# Patient Record
Sex: MALE | URBAN - METROPOLITAN AREA
[De-identification: names, ages, dates, MRNs, and addresses within clinical notes are randomized per-mention and may not be internally consistent; named-entity substitution may affect disease eponyms.]

---

## 2021-12-15 ENCOUNTER — APPOINTMENT (OUTPATIENT)
Age: 71
Setting detail: DERMATOLOGY
End: 2021-12-16

## 2021-12-15 DIAGNOSIS — L40.0 PSORIASIS VULGARIS: ICD-10-CM

## 2021-12-15 DIAGNOSIS — L82.0 INFLAMED SEBORRHEIC KERATOSIS: ICD-10-CM

## 2021-12-15 PROBLEM — D48.5 NEOPLASM OF UNCERTAIN BEHAVIOR OF SKIN: Status: ACTIVE | Noted: 2021-12-15

## 2021-12-15 PROCEDURE — OTHER COUNSELING: OTHER

## 2021-12-15 PROCEDURE — 11102 TANGNTL BX SKIN SINGLE LES: CPT | Mod: 59

## 2021-12-15 PROCEDURE — OTHER PRESCRIPTION: OTHER

## 2021-12-15 PROCEDURE — OTHER MIPS QUALITY: OTHER

## 2021-12-15 PROCEDURE — 17110 DESTRUCT B9 LESION 1-14: CPT

## 2021-12-15 PROCEDURE — 99203 OFFICE O/P NEW LOW 30 MIN: CPT | Mod: 25

## 2021-12-15 PROCEDURE — OTHER LIQUID NITROGEN: OTHER

## 2021-12-15 PROCEDURE — OTHER BIOPSY BY SHAVE METHOD: OTHER

## 2021-12-15 RX ORDER — CALCITRIOL 3 UG/G
OINTMENT TOPICAL BID
Qty: 100 | Refills: 3 | COMMUNITY
Start: 2021-12-15

## 2021-12-15 ASSESSMENT — LOCATION DETAILED DESCRIPTION DERM
LOCATION DETAILED: RIGHT ANTERIOR PROXIMAL THIGH
LOCATION DETAILED: RIGHT PROXIMAL LATERAL CALF
LOCATION DETAILED: RIGHT ELBOW
LOCATION DETAILED: LEFT ELBOW
LOCATION DETAILED: RIGHT PROXIMAL PRETIBIAL REGION

## 2021-12-15 ASSESSMENT — LOCATION SIMPLE DESCRIPTION DERM
LOCATION SIMPLE: LEFT ELBOW
LOCATION SIMPLE: RIGHT ELBOW
LOCATION SIMPLE: RIGHT CALF
LOCATION SIMPLE: RIGHT THIGH
LOCATION SIMPLE: RIGHT PRETIBIAL REGION

## 2021-12-15 ASSESSMENT — LOCATION ZONE DERM
LOCATION ZONE: LEG
LOCATION ZONE: ARM

## 2021-12-15 NOTE — PROCEDURE: BIOPSY BY SHAVE METHOD
X Size Of Lesion In Cm: 0
Notification Instructions: Patient will be notified of biopsy results. However, patient instructed to call the office if not contacted within 2 weeks.
Validate Triangulation: No
Biopsy Type: H and E
Consent: Written consent was obtained and risks were reviewed including but not limited to scarring, infection, bleeding, scabbing, incomplete removal, nerve damage and allergy to anesthesia.
Was A Bandage Applied: Yes
Electrodesiccation And Curettage Text: The wound bed was treated with electrodesiccation and curettage after the biopsy was performed.
Type Of Destruction Used: Curettage
Biopsy Method: Dermablade
Anesthesia Type: 1% lidocaine with epinephrine
Detail Level: Detailed
Electrodesiccation Text: The wound bed was treated with electrodesiccation after the biopsy was performed.
Dressing: bandage
Anesthesia Volume In Cc (Will Not Render If 0): 0.5
Depth Of Biopsy: dermis
Post-Care Instructions: I reviewed with the patient in detail post-care instructions. Patient is to keep the biopsy site dry overnight, and then apply bacitracin twice daily until healed. Patient may apply hydrogen peroxide soaks to remove any crusting.
Hemostasis: Drysol
Billing Type: Third-Party Bill
Cryotherapy Text: The wound bed was treated with cryotherapy after the biopsy was performed.
Silver Nitrate Text: The wound bed was treated with silver nitrate after the biopsy was performed.
Wound Care: Petrolatum
Curettage Text: The wound bed was treated with curettage after the biopsy was performed.
Information: Selecting Yes will display possible errors in your note based on the variables you have selected. This validation is only offered as a suggestion for you. PLEASE NOTE THAT THE VALIDATION TEXT WILL BE REMOVED WHEN YOU FINALIZE YOUR NOTE. IF YOU WANT TO FAX A PRELIMINARY NOTE YOU WILL NEED TO TOGGLE THIS TO 'NO' IF YOU DO NOT WANT IT IN YOUR FAXED NOTE.

## 2021-12-15 NOTE — PROCEDURE: LIQUID NITROGEN
Consent: The patient's consent was obtained including but not limited to risks of crusting, scabbing, blistering, scarring, darker or lighter pigmentary change, recurrence, incomplete removal and infection.
Add 52 Modifier (Optional): no
Show Aperture Variable?: Yes
Medical Necessity Information: It is in your best interest to select a reason for this procedure from the list below. All of these items fulfill various CMS LCD requirements except the new and changing color options.
Post-Care Instructions: I reviewed with the patient in detail post-care instructions. Patient is to wear sunprotection, and avoid picking at any of the treated lesions. Pt may apply Vaseline to crusted or scabbing areas.
Detail Level: Detailed
Medical Necessity Clause: This procedure was medically necessary because the lesions that were treated were:

## 2021-12-15 NOTE — PROCEDURE: MIPS QUALITY
Quality 110: Preventive Care And Screening: Influenza Immunization: Influenza Immunization Ordered or Recommended, but not Administered due to system reason
Quality 111:Pneumonia Vaccination Status For Older Adults: Pneumococcal Vaccination not Administered or Previously Received, Reason not Otherwise Specified
Quality 134: Screening For Clinical Depression And Follow-Up Plan: The patient was screened for depression and the screen was positive and follow up plan documented
Quality 130: Documentation Of Current Medications In The Medical Record: Current Medications Documented
Quality 431: Preventive Care And Screening: Unhealthy Alcohol Use - Screening: Patient identified as an unhealthy alcohol user when screened for unhealthy alcohol use using a systematic screening method and received brief counseling
Quality 226: Preventive Care And Screening: Tobacco Use: Screening And Cessation Intervention: Tobacco Screening not Performed for Medical Reasons
Detail Level: Detailed
Quality 402: Tobacco Use And Help With Quitting Among Adolescents: Patient screened for tobacco and never smoked

## 2022-01-24 ENCOUNTER — APPOINTMENT (OUTPATIENT)
Age: 72
Setting detail: DERMATOLOGY
End: 2022-01-25

## 2022-01-24 DIAGNOSIS — L40.0 PSORIASIS VULGARIS: ICD-10-CM

## 2022-01-24 DIAGNOSIS — L82.0 INFLAMED SEBORRHEIC KERATOSIS: ICD-10-CM

## 2022-01-24 PROCEDURE — OTHER LIQUID NITROGEN: OTHER

## 2022-01-24 PROCEDURE — 99213 OFFICE O/P EST LOW 20 MIN: CPT | Mod: 25

## 2022-01-24 PROCEDURE — 17110 DESTRUCT B9 LESION 1-14: CPT

## 2022-01-24 PROCEDURE — OTHER COUNSELING: OTHER

## 2022-01-24 PROCEDURE — OTHER MIPS QUALITY: OTHER

## 2022-01-24 PROCEDURE — OTHER PRESCRIPTION: OTHER

## 2022-01-24 RX ORDER — CALCIPOTRIENE 0.05 MG/G
CREAM TOPICAL BID
Qty: 120 | Refills: 3 | Status: ERX | COMMUNITY
Start: 2022-01-24

## 2022-01-24 RX ORDER — BETAMETHASONE DIPROPIONATE 0.5 MG/G
1 CREAM TOPICAL BID
Qty: 45 | Refills: 3 | Status: ERX | COMMUNITY
Start: 2022-01-24

## 2022-01-24 ASSESSMENT — LOCATION ZONE DERM
LOCATION ZONE: TRUNK
LOCATION ZONE: ARM
LOCATION ZONE: LEG

## 2022-01-24 ASSESSMENT — LOCATION DETAILED DESCRIPTION DERM
LOCATION DETAILED: RIGHT ELBOW
LOCATION DETAILED: PERIUMBILICAL SKIN
LOCATION DETAILED: LEFT INFERIOR LATERAL MIDBACK
LOCATION DETAILED: RIGHT ANTERIOR PROXIMAL THIGH
LOCATION DETAILED: LEFT ELBOW

## 2022-01-24 ASSESSMENT — LOCATION SIMPLE DESCRIPTION DERM
LOCATION SIMPLE: LEFT ELBOW
LOCATION SIMPLE: LEFT LOWER BACK
LOCATION SIMPLE: ABDOMEN
LOCATION SIMPLE: RIGHT ELBOW
LOCATION SIMPLE: RIGHT THIGH

## 2022-01-24 NOTE — PROCEDURE: LIQUID NITROGEN
Show Aperture Variable?: Yes
Detail Level: Detailed
Medical Necessity Clause: This procedure was medically necessary because the lesions that were treated were:
Add 52 Modifier (Optional): no
Spray Paint Text: The liquid nitrogen was applied to the skin utilizing a spray paint frosting technique.
Post-Care Instructions: I reviewed with the patient in detail post-care instructions. Patient is to wear sunprotection, and avoid picking at any of the treated lesions. Pt may apply Vaseline to crusted or scabbing areas.
Consent: The patient's consent was obtained including but not limited to risks of crusting, scabbing, blistering, scarring, darker or lighter pigmentary change, recurrence, incomplete removal and infection.
Medical Necessity Information: It is in your best interest to select a reason for this procedure from the list below. All of these items fulfill various CMS LCD requirements except the new and changing color options.

## 2022-01-24 NOTE — PROCEDURE: MIPS QUALITY
Quality 111:Pneumonia Vaccination Status For Older Adults: Pneumococcal Vaccination not Administered or Previously Received, Reason not Otherwise Specified
Detail Level: Detailed
Quality 130: Documentation Of Current Medications In The Medical Record: Current Medications Documented
Quality 431: Preventive Care And Screening: Unhealthy Alcohol Use - Screening: Patient identified as an unhealthy alcohol user when screened for unhealthy alcohol use using a systematic screening method and received brief counseling
Quality 402: Tobacco Use And Help With Quitting Among Adolescents: Patient screened for tobacco and never smoked
Quality 110: Preventive Care And Screening: Influenza Immunization: Influenza Immunization Ordered or Recommended, but not Administered due to system reason
Quality 134: Screening For Clinical Depression And Follow-Up Plan: The patient was screened for depression and the screen was positive and follow up plan documented
Quality 226: Preventive Care And Screening: Tobacco Use: Screening And Cessation Intervention: Tobacco Screening not Performed for Medical Reasons

## 2022-04-13 ENCOUNTER — APPOINTMENT (OUTPATIENT)
Age: 72
Setting detail: DERMATOLOGY
End: 2022-04-15

## 2022-04-13 DIAGNOSIS — L40.0 PSORIASIS VULGARIS: ICD-10-CM

## 2022-04-13 PROCEDURE — OTHER COUNSELING: OTHER

## 2022-04-13 PROCEDURE — 99212 OFFICE O/P EST SF 10 MIN: CPT

## 2022-04-13 PROCEDURE — OTHER ADDITIONAL NOTES: OTHER

## 2022-04-13 PROCEDURE — OTHER MIPS QUALITY: OTHER

## 2022-04-13 ASSESSMENT — LOCATION SIMPLE DESCRIPTION DERM
LOCATION SIMPLE: RIGHT ELBOW
LOCATION SIMPLE: LEFT ELBOW

## 2022-04-13 ASSESSMENT — LOCATION DETAILED DESCRIPTION DERM
LOCATION DETAILED: LEFT ELBOW
LOCATION DETAILED: RIGHT ELBOW

## 2022-04-13 ASSESSMENT — LOCATION ZONE DERM: LOCATION ZONE: ARM

## 2022-04-13 NOTE — PROCEDURE: ADDITIONAL NOTES
Additional Notes: Pt seen some improvement use the enstilar sample we provided
Render Risk Assessment In Note?: no
Detail Level: Simple

## 2022-04-13 NOTE — PROCEDURE: MIPS QUALITY
Quality 110: Preventive Care And Screening: Influenza Immunization: Influenza Immunization Ordered or Recommended, but not Administered due to system reason
Quality 226: Preventive Care And Screening: Tobacco Use: Screening And Cessation Intervention: Tobacco Screening not Performed for Medical Reasons
Quality 431: Preventive Care And Screening: Unhealthy Alcohol Use - Screening: Patient identified as an unhealthy alcohol user when screened for unhealthy alcohol use using a systematic screening method and received brief counseling
Quality 134: Screening For Clinical Depression And Follow-Up Plan: The patient was screened for depression and the screen was positive and follow up plan documented
Quality 402: Tobacco Use And Help With Quitting Among Adolescents: Patient screened for tobacco and never smoked
Quality 130: Documentation Of Current Medications In The Medical Record: Current Medications Documented
Detail Level: Detailed
Quality 111:Pneumonia Vaccination Status For Older Adults: Pneumococcal Vaccination not Administered or Previously Received, Reason not Otherwise Specified

## 2022-07-13 ENCOUNTER — APPOINTMENT (OUTPATIENT)
Age: 72
Setting detail: DERMATOLOGY
End: 2022-07-14

## 2022-07-13 DIAGNOSIS — L85.3 XEROSIS CUTIS: ICD-10-CM

## 2022-07-13 DIAGNOSIS — L40.0 PSORIASIS VULGARIS: ICD-10-CM

## 2022-07-13 PROCEDURE — OTHER COUNSELING: OTHER

## 2022-07-13 PROCEDURE — OTHER PRESCRIPTION MEDICATION MANAGEMENT: OTHER

## 2022-07-13 PROCEDURE — 99213 OFFICE O/P EST LOW 20 MIN: CPT

## 2022-07-13 PROCEDURE — OTHER MIPS QUALITY: OTHER

## 2022-07-13 PROCEDURE — OTHER ADDITIONAL NOTES: OTHER

## 2022-07-13 ASSESSMENT — LOCATION SIMPLE DESCRIPTION DERM
LOCATION SIMPLE: LEFT ELBOW
LOCATION SIMPLE: LEFT FOREARM
LOCATION SIMPLE: RIGHT FOREARM
LOCATION SIMPLE: LEFT FOREARM
LOCATION SIMPLE: RIGHT FOREARM
LOCATION SIMPLE: RIGHT ELBOW

## 2022-07-13 ASSESSMENT — LOCATION DETAILED DESCRIPTION DERM
LOCATION DETAILED: LEFT ELBOW
LOCATION DETAILED: RIGHT PROXIMAL DORSAL FOREARM
LOCATION DETAILED: LEFT PROXIMAL DORSAL FOREARM
LOCATION DETAILED: LEFT PROXIMAL DORSAL FOREARM
LOCATION DETAILED: RIGHT ELBOW
LOCATION DETAILED: RIGHT PROXIMAL DORSAL FOREARM

## 2022-07-13 ASSESSMENT — LOCATION ZONE DERM
LOCATION ZONE: ARM
LOCATION ZONE: ARM

## 2022-07-13 NOTE — PROCEDURE: ADDITIONAL NOTES
Detail Level: Simple
Render Risk Assessment In Note?: no
Additional Notes: Pt uses otc ammonium lactate, and is getting darker skin tone from the lotion.

## 2022-07-13 NOTE — PROCEDURE: MIPS QUALITY
Quality 134: Screening For Clinical Depression And Follow-Up Plan: The patient was screened for depression and the screen was positive and follow up plan documented
Detail Level: Detailed
Quality 110: Preventive Care And Screening: Influenza Immunization: Influenza Immunization Ordered or Recommended, but not Administered due to system reason
Quality 402: Tobacco Use And Help With Quitting Among Adolescents: Patient screened for tobacco and never smoked
Quality 130: Documentation Of Current Medications In The Medical Record: Current Medications Documented
Quality 111:Pneumonia Vaccination Status For Older Adults: Pneumococcal Vaccination not Administered or Previously Received, Reason not Otherwise Specified
Quality 226: Preventive Care And Screening: Tobacco Use: Screening And Cessation Intervention: Tobacco Screening not Performed for Medical Reasons
Quality 431: Preventive Care And Screening: Unhealthy Alcohol Use - Screening: Patient identified as an unhealthy alcohol user when screened for unhealthy alcohol use using a systematic screening method and received brief counseling

## 2022-12-12 ENCOUNTER — RX ONLY (RX ONLY)
Age: 72
End: 2022-12-12

## 2022-12-12 RX ORDER — CALCIPOTRIENE AND BETAMETHASONE DIPROPIONATE 50; .5 UG/G; MG/G
AEROSOL, FOAM TOPICAL QD
Qty: 60 | Refills: 0 | Status: ERX | COMMUNITY
Start: 2022-12-12

## 2023-01-17 ENCOUNTER — APPOINTMENT (OUTPATIENT)
Age: 73
Setting detail: DERMATOLOGY
End: 2023-01-17

## 2023-01-17 DIAGNOSIS — L40.0 PSORIASIS VULGARIS: ICD-10-CM

## 2023-01-17 PROCEDURE — OTHER PRESCRIPTION MEDICATION MANAGEMENT: OTHER

## 2023-01-17 PROCEDURE — OTHER MIPS QUALITY: OTHER

## 2023-01-17 PROCEDURE — 99213 OFFICE O/P EST LOW 20 MIN: CPT

## 2023-01-17 PROCEDURE — OTHER COUNSELING: OTHER

## 2023-01-17 ASSESSMENT — LOCATION SIMPLE DESCRIPTION DERM
LOCATION SIMPLE: LEFT ELBOW
LOCATION SIMPLE: RIGHT ELBOW

## 2023-01-17 ASSESSMENT — LOCATION ZONE DERM: LOCATION ZONE: ARM

## 2023-01-17 ASSESSMENT — LOCATION DETAILED DESCRIPTION DERM
LOCATION DETAILED: LEFT ELBOW
LOCATION DETAILED: RIGHT ELBOW

## 2023-01-17 NOTE — PROCEDURE: MIPS QUALITY
Quality 130: Documentation Of Current Medications In The Medical Record: Current Medications Documented
Quality 111:Pneumonia Vaccination Status For Older Adults: Pneumococcal Vaccination not Administered or Previously Received, Reason not Otherwise Specified
Detail Level: Detailed
Quality 134: Screening For Clinical Depression And Follow-Up Plan: The patient was screened for depression and the screen was positive and follow up plan documented
Quality 226: Preventive Care And Screening: Tobacco Use: Screening And Cessation Intervention: Tobacco Screening not Performed for Medical Reasons
Quality 110: Preventive Care And Screening: Influenza Immunization: Influenza Immunization Ordered or Recommended, but not Administered due to system reason
Quality 431: Preventive Care And Screening: Unhealthy Alcohol Use - Screening: Patient identified as an unhealthy alcohol user when screened for unhealthy alcohol use using a systematic screening method and received brief counseling
Quality 402: Tobacco Use And Help With Quitting Among Adolescents: Patient screened for tobacco and never smoked

## 2023-01-17 NOTE — PROCEDURE: PRESCRIPTION MEDICATION MANAGEMENT
Render In Strict Bullet Format?: No
Plan: If pt runs out of Enstilar, pt will call us and we will send over calcipitrian cream, and betamethasone cream if the Enstilar is no longer covered by insurance.
Continue Regimen: Enstilar 2 times a week
Detail Level: Zone

## 2024-01-18 ENCOUNTER — APPOINTMENT (OUTPATIENT)
Dept: URBAN - METROPOLITAN AREA SURGERY 23 | Age: 74
Setting detail: DERMATOLOGY
End: 2024-01-18

## 2024-01-18 DIAGNOSIS — L40.0 PSORIASIS VULGARIS: ICD-10-CM

## 2024-01-18 PROCEDURE — OTHER COUNSELING: OTHER

## 2024-01-18 PROCEDURE — 99213 OFFICE O/P EST LOW 20 MIN: CPT

## 2024-01-18 PROCEDURE — OTHER MIPS QUALITY: OTHER

## 2024-01-18 PROCEDURE — OTHER PRESCRIPTION MEDICATION MANAGEMENT: OTHER

## 2024-01-18 PROCEDURE — OTHER PRESCRIPTION: OTHER

## 2024-01-18 RX ORDER — BETAMETHASONE DIPROPIONATE 0.5 MG/G
CREAM TOPICAL
Qty: 45 | Refills: 2 | Status: ERX | COMMUNITY
Start: 2024-01-18

## 2024-01-18 ASSESSMENT — LOCATION DETAILED DESCRIPTION DERM
LOCATION DETAILED: RIGHT ELBOW
LOCATION DETAILED: LEFT ELBOW

## 2024-01-18 ASSESSMENT — ITCH NUMERIC RATING SCALE: HOW SEVERE IS YOUR ITCHING?: 0

## 2024-01-18 ASSESSMENT — LOCATION SIMPLE DESCRIPTION DERM
LOCATION SIMPLE: LEFT ELBOW
LOCATION SIMPLE: RIGHT ELBOW

## 2024-01-18 ASSESSMENT — BSA PSORIASIS: % BODY COVERED IN PSORIASIS: 5

## 2024-01-18 ASSESSMENT — LOCATION ZONE DERM: LOCATION ZONE: ARM

## 2024-01-18 NOTE — PROCEDURE: PRESCRIPTION MEDICATION MANAGEMENT
Detail Level: Zone
Render In Strict Bullet Format?: No
Continue Regimen: Enstillar and Betamethasone as needed for flares

## 2025-01-21 ENCOUNTER — APPOINTMENT (OUTPATIENT)
Dept: URBAN - METROPOLITAN AREA SURGERY 23 | Age: 75
Setting detail: DERMATOLOGY
End: 2025-01-21

## 2025-01-21 DIAGNOSIS — L40.0 PSORIASIS VULGARIS: ICD-10-CM

## 2025-01-21 PROCEDURE — 99213 OFFICE O/P EST LOW 20 MIN: CPT

## 2025-01-21 PROCEDURE — OTHER TREATMENT REGIMEN: OTHER

## 2025-01-21 PROCEDURE — OTHER COUNSELING: OTHER

## 2025-01-21 PROCEDURE — OTHER MIPS QUALITY: OTHER

## 2025-01-21 ASSESSMENT — LOCATION DETAILED DESCRIPTION DERM
LOCATION DETAILED: LEFT ELBOW
LOCATION DETAILED: RIGHT ELBOW

## 2025-01-21 ASSESSMENT — LOCATION ZONE DERM: LOCATION ZONE: ARM

## 2025-01-21 ASSESSMENT — LOCATION SIMPLE DESCRIPTION DERM
LOCATION SIMPLE: LEFT ELBOW
LOCATION SIMPLE: RIGHT ELBOW

## 2025-01-21 NOTE — PROCEDURE: TREATMENT REGIMEN
Action 2: Continue
Plan: No joint pain, 1%bsa
Initiate Regimen: Calcipotriene aaa on elbows daily prn (Patient will call when Enstillar is gone for prescription)
Detail Level: Zone
Continue Regimen: Betamethasone aaa on elbows 2/weeks per month as needed